# Patient Record
Sex: FEMALE | Race: WHITE | NOT HISPANIC OR LATINO | URBAN - METROPOLITAN AREA
[De-identification: names, ages, dates, MRNs, and addresses within clinical notes are randomized per-mention and may not be internally consistent; named-entity substitution may affect disease eponyms.]

---

## 2023-01-09 ENCOUNTER — OFFICE (OUTPATIENT)
Dept: URBAN - METROPOLITAN AREA CLINIC 64 | Facility: CLINIC | Age: 23
End: 2023-01-09

## 2023-01-09 VITALS
HEART RATE: 86 BPM | SYSTOLIC BLOOD PRESSURE: 128 MMHG | HEIGHT: 58 IN | DIASTOLIC BLOOD PRESSURE: 83 MMHG | WEIGHT: 150 LBS

## 2023-01-09 DIAGNOSIS — R10.13 EPIGASTRIC PAIN: ICD-10-CM

## 2023-01-09 DIAGNOSIS — R10.11 RIGHT UPPER QUADRANT PAIN: ICD-10-CM

## 2023-01-09 DIAGNOSIS — R11.0 NAUSEA: ICD-10-CM

## 2023-01-09 PROCEDURE — 99204 OFFICE O/P NEW MOD 45 MIN: CPT | Performed by: INTERNAL MEDICINE

## 2023-01-09 RX ORDER — PANTOPRAZOLE SODIUM 40 MG/1
TABLET, DELAYED RELEASE ORAL
Qty: 90 | Refills: 3 | Status: ACTIVE

## 2025-02-04 ENCOUNTER — APPOINTMENT (OUTPATIENT)
Dept: ULTRASOUND IMAGING | Facility: HOSPITAL | Age: 25
End: 2025-02-04
Payer: COMMERCIAL

## 2025-02-04 ENCOUNTER — HOSPITAL ENCOUNTER (OUTPATIENT)
Facility: HOSPITAL | Age: 25
Setting detail: OBSERVATION
Discharge: HOME | End: 2025-02-05
Attending: INTERNAL MEDICINE | Admitting: INTERNAL MEDICINE
Payer: COMMERCIAL

## 2025-02-04 DIAGNOSIS — R31.9 HEMATURIA, UNSPECIFIED TYPE: ICD-10-CM

## 2025-02-04 DIAGNOSIS — N13.30 HYDRONEPHROSIS, UNSPECIFIED HYDRONEPHROSIS TYPE: Primary | ICD-10-CM

## 2025-02-04 DIAGNOSIS — R10.9 FLANK PAIN: ICD-10-CM

## 2025-02-04 DIAGNOSIS — D64.9 ANEMIA, UNSPECIFIED TYPE: ICD-10-CM

## 2025-02-04 DIAGNOSIS — R01.1 HEART MURMUR: ICD-10-CM

## 2025-02-04 LAB
ANION GAP SERPL CALCULATED.3IONS-SCNC: 13.1 MMOL/L (ref 5–15)
BACTERIA UR QL AUTO: ABNORMAL /HPF
BASOPHILS # BLD AUTO: 0.03 10*3/MM3 (ref 0–0.2)
BASOPHILS NFR BLD AUTO: 0.3 % (ref 0–1.5)
BILIRUB UR QL STRIP: NEGATIVE
BUN SERPL-MCNC: 8 MG/DL (ref 6–20)
BUN/CREAT SERPL: 20 (ref 7–25)
CALCIUM SPEC-SCNC: 9.3 MG/DL (ref 8.6–10.5)
CHLORIDE SERPL-SCNC: 104 MMOL/L (ref 98–107)
CLARITY UR: CLEAR
CO2 SERPL-SCNC: 20.9 MMOL/L (ref 22–29)
COLOR UR: YELLOW
CREAT SERPL-MCNC: 0.4 MG/DL (ref 0.57–1)
DEPRECATED RDW RBC AUTO: 42.4 FL (ref 37–54)
EGFRCR SERPLBLD CKD-EPI 2021: 141.1 ML/MIN/1.73
EOSINOPHIL # BLD AUTO: 0.18 10*3/MM3 (ref 0–0.4)
EOSINOPHIL NFR BLD AUTO: 1.8 % (ref 0.3–6.2)
ERYTHROCYTE [DISTWIDTH] IN BLOOD BY AUTOMATED COUNT: 12.9 % (ref 12.3–15.4)
GLUCOSE SERPL-MCNC: 85 MG/DL (ref 65–99)
GLUCOSE UR STRIP-MCNC: NEGATIVE MG/DL
HCT VFR BLD AUTO: 33 % (ref 34–46.6)
HGB BLD-MCNC: 11.2 G/DL (ref 12–15.9)
HGB UR QL STRIP.AUTO: ABNORMAL
HYALINE CASTS UR QL AUTO: ABNORMAL /LPF
IMM GRANULOCYTES # BLD AUTO: 0.04 10*3/MM3 (ref 0–0.05)
IMM GRANULOCYTES NFR BLD AUTO: 0.4 % (ref 0–0.5)
KETONES UR QL STRIP: NEGATIVE
LEUKOCYTE ESTERASE UR QL STRIP.AUTO: NEGATIVE
LYMPHOCYTES # BLD AUTO: 1.26 10*3/MM3 (ref 0.7–3.1)
LYMPHOCYTES NFR BLD AUTO: 12.6 % (ref 19.6–45.3)
MCH RBC QN AUTO: 30.1 PG (ref 26.6–33)
MCHC RBC AUTO-ENTMCNC: 33.9 G/DL (ref 31.5–35.7)
MCV RBC AUTO: 88.7 FL (ref 79–97)
MONOCYTES # BLD AUTO: 0.72 10*3/MM3 (ref 0.1–0.9)
MONOCYTES NFR BLD AUTO: 7.2 % (ref 5–12)
NEUTROPHILS NFR BLD AUTO: 7.76 10*3/MM3 (ref 1.7–7)
NEUTROPHILS NFR BLD AUTO: 77.7 % (ref 42.7–76)
NITRITE UR QL STRIP: NEGATIVE
NRBC BLD AUTO-RTO: 0 /100 WBC (ref 0–0.2)
PH UR STRIP.AUTO: 6.5 [PH] (ref 5–8)
PLATELET # BLD AUTO: 244 10*3/MM3 (ref 140–450)
PMV BLD AUTO: 9 FL (ref 6–12)
POTASSIUM SERPL-SCNC: 4 MMOL/L (ref 3.5–5.2)
PROT UR QL STRIP: NEGATIVE
RBC # BLD AUTO: 3.72 10*6/MM3 (ref 3.77–5.28)
RBC # UR STRIP: ABNORMAL /HPF
REF LAB TEST METHOD: ABNORMAL
SODIUM SERPL-SCNC: 138 MMOL/L (ref 136–145)
SP GR UR STRIP: 1.01 (ref 1–1.03)
SQUAMOUS #/AREA URNS HPF: ABNORMAL /HPF
UROBILINOGEN UR QL STRIP: ABNORMAL
WBC # UR STRIP: ABNORMAL /HPF
WBC NRBC COR # BLD AUTO: 9.99 10*3/MM3 (ref 3.4–10.8)

## 2025-02-04 PROCEDURE — G0378 HOSPITAL OBSERVATION PER HR: HCPCS

## 2025-02-04 PROCEDURE — 76775 US EXAM ABDO BACK WALL LIM: CPT

## 2025-02-04 PROCEDURE — 81001 URINALYSIS AUTO W/SCOPE: CPT | Performed by: OCCUPATIONAL THERAPIST

## 2025-02-04 PROCEDURE — 80048 BASIC METABOLIC PNL TOTAL CA: CPT | Performed by: OCCUPATIONAL THERAPIST

## 2025-02-04 PROCEDURE — 99285 EMERGENCY DEPT VISIT HI MDM: CPT

## 2025-02-04 PROCEDURE — 85025 COMPLETE CBC W/AUTO DIFF WBC: CPT | Performed by: OCCUPATIONAL THERAPIST

## 2025-02-04 PROCEDURE — 36415 COLL VENOUS BLD VENIPUNCTURE: CPT

## 2025-02-04 PROCEDURE — 76805 OB US >/= 14 WKS SNGL FETUS: CPT

## 2025-02-04 RX ORDER — PRENATAL VIT/IRON FUM/FOLIC AC 27MG-0.8MG
1 TABLET ORAL DAILY
Status: DISCONTINUED | OUTPATIENT
Start: 2025-02-05 | End: 2025-02-05 | Stop reason: HOSPADM

## 2025-02-04 RX ORDER — SODIUM CHLORIDE 9 MG/ML
75 INJECTION, SOLUTION INTRAVENOUS CONTINUOUS
Status: DISCONTINUED | OUTPATIENT
Start: 2025-02-05 | End: 2025-02-05 | Stop reason: HOSPADM

## 2025-02-04 RX ORDER — SODIUM CHLORIDE 0.9 % (FLUSH) 0.9 %
10 SYRINGE (ML) INJECTION AS NEEDED
Status: DISCONTINUED | OUTPATIENT
Start: 2025-02-04 | End: 2025-02-05 | Stop reason: HOSPADM

## 2025-02-04 RX ORDER — ONDANSETRON 2 MG/ML
4 INJECTION INTRAMUSCULAR; INTRAVENOUS EVERY 6 HOURS PRN
Status: DISCONTINUED | OUTPATIENT
Start: 2025-02-04 | End: 2025-02-05 | Stop reason: HOSPADM

## 2025-02-04 RX ORDER — ACETAMINOPHEN 325 MG/1
650 TABLET ORAL EVERY 6 HOURS PRN
COMMUNITY

## 2025-02-04 RX ORDER — ACETAMINOPHEN 325 MG/1
650 TABLET ORAL EVERY 6 HOURS PRN
Status: DISCONTINUED | OUTPATIENT
Start: 2025-02-04 | End: 2025-02-05 | Stop reason: HOSPADM

## 2025-02-04 RX ORDER — ASPIRIN 81 MG/1
81 TABLET ORAL NIGHTLY
COMMUNITY

## 2025-02-04 RX ORDER — SODIUM CHLORIDE 0.9 % (FLUSH) 0.9 %
10 SYRINGE (ML) INJECTION EVERY 12 HOURS SCHEDULED
Status: DISCONTINUED | OUTPATIENT
Start: 2025-02-04 | End: 2025-02-05 | Stop reason: HOSPADM

## 2025-02-04 RX ORDER — AMOXICILLIN 250 MG
2 CAPSULE ORAL 2 TIMES DAILY PRN
Status: DISCONTINUED | OUTPATIENT
Start: 2025-02-04 | End: 2025-02-05 | Stop reason: HOSPADM

## 2025-02-04 RX ORDER — ONDANSETRON 4 MG/1
4 TABLET, ORALLY DISINTEGRATING ORAL EVERY 6 HOURS PRN
Status: DISCONTINUED | OUTPATIENT
Start: 2025-02-04 | End: 2025-02-05 | Stop reason: HOSPADM

## 2025-02-04 RX ORDER — SODIUM CHLORIDE 9 MG/ML
40 INJECTION, SOLUTION INTRAVENOUS AS NEEDED
Status: DISCONTINUED | OUTPATIENT
Start: 2025-02-04 | End: 2025-02-04

## 2025-02-04 RX ADMIN — Medication 10 ML: at 20:54

## 2025-02-04 RX ADMIN — Medication 10 ML: at 20:52

## 2025-02-04 NOTE — ED PROVIDER NOTES
Subjective   History of Present Illness  Patient is a 24-year-old G1, P0 female with ALEE of 7/29/2025 presenting to the ED for evaluation of flank pain.  She reports that 1 week ago, she was admitted overnight to [facility] for suspected nephrolithiasis.  Right flank pain was managed with morphine and Zofran, and eventually pain stopped and she was discharged home.  She reports that her urine was strained, and she did not see any large stones but it looked like sand.      She went back to [facility] 3 days ago for left flank pain, and they repeated labs but did not repeat imaging.  They gave her hydrocodone and told her to take that when she had pain.      Patient reports that pain came back today while she was at work and was a sharp stabbing pain in her left side.  She rates the pain 8 or 9 out of 10.  She took one of the hydrocodone's which decreased the pain.  She called her OB/GYN, Dr. Yu, and she recommended that she come here for further evaluation.      Patient reports that the pain is associated with vomiting.  Otherwise patient is not experiencing nausea, diarrhea, vomiting, fever, body aches, cough, nasal congestion, rhinorrhea.  She has not noticed any change in the color of her urine.  No vaginal bleeding or uterine cramping        Review of Systems   Constitutional:  Negative for fever.       No past medical history on file.    No Known Allergies    Past Surgical History:   Procedure Laterality Date    CHOLECYSTECTOMY         No family history on file.    Social History     Socioeconomic History    Marital status:    Tobacco Use    Smoking status: Never     Passive exposure: Never    Smokeless tobacco: Never   Vaping Use    Vaping status: Never Used   Substance and Sexual Activity    Alcohol use: Never    Drug use: Never    Sexual activity: Defer           Objective   Physical Exam  Vitals and nursing note reviewed.   Constitutional:       General: She is not in acute distress.      Appearance: She is well-developed and normal weight. She is not ill-appearing, toxic-appearing or diaphoretic.   HENT:      Head: Normocephalic and atraumatic.      Mouth/Throat:      Mouth: Mucous membranes are moist.   Eyes:      Extraocular Movements: Extraocular movements intact.      Pupils: Pupils are equal, round, and reactive to light.   Cardiovascular:      Rate and Rhythm: Normal rate and regular rhythm.      Heart sounds: Murmur heard.      No friction rub. No gallop.   Pulmonary:      Effort: Pulmonary effort is normal. No respiratory distress.      Breath sounds: Normal breath sounds. No stridor. No wheezing, rhonchi or rales.   Chest:      Chest wall: No tenderness.   Abdominal:      General: Bowel sounds are normal.      Palpations: Abdomen is soft. There is no hepatomegaly, splenomegaly or pulsatile mass.      Tenderness: There is no abdominal tenderness. There is no right CVA tenderness, left CVA tenderness or guarding. Negative signs include Gonzalez's sign, Rovsing's sign and McBurney's sign.   Skin:     General: Skin is warm and dry.      Capillary Refill: Capillary refill takes less than 2 seconds.      Coloration: Skin is not jaundiced or mottled.      Findings: No erythema or rash.   Neurological:      General: No focal deficit present.      Mental Status: She is alert.   Psychiatric:         Mood and Affect: Mood normal.         Behavior: Behavior normal.         Procedures           ED Course  ED Course as of 02/04/25 1749 Tue Feb 04, 2025   1457 Waiting on ultrasound [ME]   1654 Waiting on return call from Dr. Yu. [ME]   1712 Discussed patient with Dr. Yu.  She would like urology consulted for recommendations. [ME]      ED Course User Index  [ME] Andressa Jin PA-C      Labs Reviewed   BASIC METABOLIC PANEL - Abnormal; Notable for the following components:       Result Value    Creatinine 0.40 (*)     CO2 20.9 (*)     All other components within normal limits    Narrative:     GFR  Categories in Chronic Kidney Disease (CKD)      GFR Category          GFR (mL/min/1.73)    Interpretation  G1                     90 or greater         Normal or high (1)  G2                      60-89                Mild decrease (1)  G3a                   45-59                Mild to moderate decrease  G3b                   30-44                Moderate to severe decrease  G4                    15-29                Severe decrease  G5                    14 or less           Kidney failure          (1)In the absence of evidence of kidney disease, neither GFR category G1 or G2 fulfill the criteria for CKD.    eGFR calculation 2021 CKD-EPI creatinine equation, which does not include race as a factor   URINALYSIS W/ CULTURE IF INDICATED - Abnormal; Notable for the following components:    Blood, UA Moderate (2+) (*)     All other components within normal limits    Narrative:     In absence of clinical symptoms, the presence of pyuria, bacteria, and/or nitrites on the urinalysis result does not correlate with infection.   CBC WITH AUTO DIFFERENTIAL - Abnormal; Notable for the following components:    RBC 3.72 (*)     Hemoglobin 11.2 (*)     Hematocrit 33.0 (*)     Neutrophil % 77.7 (*)     Lymphocyte % 12.6 (*)     Neutrophils, Absolute 7.76 (*)     All other components within normal limits   URINALYSIS, MICROSCOPIC ONLY - Abnormal; Notable for the following components:    RBC, UA 6-10 (*)     Squamous Epithelial Cells, UA 3-6 (*)     All other components within normal limits   CBC AND DIFFERENTIAL    Narrative:     The following orders were created for panel order CBC & Differential.  Procedure                               Abnormality         Status                     ---------                               -----------         ------                     CBC Auto Differential[[id number]]        Abnormal            Final result                 Please view results for these tests on the individual orders.     US Ob 14 +  Weeks Single or First Gestation    Result Date: 2/4/2025  Impression: 1. Single viable intrauterine pregnancy with an estimated gestational age of 14 weeks and 6 days. There is normal interval growth when compared to the recent ultrasound. 2. The placenta is located posteriorly. The tip of the placenta may cover the internal os representing at least a marginal placenta previa. I would recommend a follow-up ultrasound during the late second trimester to better assess this. There is also a hypoechoic area in the placenta representing either an old placental infarct or venous lake. There is no placental abruption. Electronically Signed: Jaret Oneill MD  2/4/2025 4:17 PM EST  Workstation ID: [id number]    US Renal Bilateral    Result Date: 2/4/2025  Impression: 1. Mild left-sided hydronephrosis new from prior exam. 2. Normal appearance of the right kidney with no evidence of right-sided hydronephrosis. Electronically Signed: Cihn Sawant MD  2/4/2025 4:17 PM EST  Workstation ID: [id number]   Medications - No data to display                                                   Medical Decision Making  Patient is a 19-year-old female who presented with the above complaint.  She had the above evaluation and exam.    Labs grossly unremarkable.  Patient had mild anemia with 11.2 hemoglobin.  She did have some hematuria on urinalysis, so ultrasound was ordered.     Imaging independently interpreted by radiology and reviewed by myself.  OB ultrasound was normal for gestational age.  There was some posterior placement of the placenta, for which the recommendation was repeat ultrasound later in the pregnancy.  Ultrasound of bilateral kidneys showed mild left-sided hydronephrosis which is new from prior exam.  No abnormality of the right kidney.  There were no stones evident on exam.    Patient was discussed at length with Dr. Yu, who requested urology consultation.  Discussed patient with Dr. Sykes of urology, who suggested that  patient be admitted.  He will see patient in the morning to determine further course of action.  She will need to be n.p.o. after midnight.  Dr. Yu provided consent for patient to admit to the hospitalist versus the OB unit.    At reassessment, patient is resting comfortably no acute distress.  She is hemodynamically stable and afebrile.  No vaginal bleeding or uterine cramping.  No peritonitis or focal tenderness on abdominal exam.  Discussed patient with Dr. Miller, who accepts patient for admission        Amount and/or Complexity of Data Reviewed  Labs: ordered.  Radiology: ordered.        Final diagnoses:   Hematuria, unspecified type   Flank pain   Heart murmur   Anemia, unspecified type   Hydronephrosis, unspecified hydronephrosis type       ED Disposition  ED Disposition       ED Disposition   Intended Admit    Condition   --    Comment   --               No follow-up provider specified.       Medication List      No changes were made to your prescriptions during this visit.            Andressa Jin PA-C  02/04/25 5845

## 2025-02-04 NOTE — Clinical Note
Level of Care: Med/Surg [1]   Admitting Physician: DIETER PAYNE [[id number]]   Attending Physician: DIETER PAYNE [[id number]]

## 2025-02-05 VITALS
HEIGHT: 58 IN | BODY MASS INDEX: 30.13 KG/M2 | WEIGHT: 143.52 LBS | DIASTOLIC BLOOD PRESSURE: 78 MMHG | OXYGEN SATURATION: 99 % | RESPIRATION RATE: 16 BRPM | HEART RATE: 89 BPM | SYSTOLIC BLOOD PRESSURE: 115 MMHG | TEMPERATURE: 97.8 F

## 2025-02-05 LAB
ALBUMIN SERPL-MCNC: 3.9 G/DL (ref 3.5–5.2)
ALBUMIN/GLOB SERPL: 1.6 G/DL
ALP SERPL-CCNC: 49 U/L (ref 39–117)
ALT SERPL W P-5'-P-CCNC: 14 U/L (ref 1–33)
ANION GAP SERPL CALCULATED.3IONS-SCNC: 11 MMOL/L (ref 5–15)
AST SERPL-CCNC: 13 U/L (ref 1–32)
BASOPHILS # BLD AUTO: 0.03 10*3/MM3 (ref 0–0.2)
BASOPHILS NFR BLD AUTO: 0.4 % (ref 0–1.5)
BILIRUB SERPL-MCNC: <0.2 MG/DL (ref 0–1.2)
BUN SERPL-MCNC: 6 MG/DL (ref 6–20)
BUN/CREAT SERPL: 14.6 (ref 7–25)
CALCIUM SPEC-SCNC: 9.1 MG/DL (ref 8.6–10.5)
CHLORIDE SERPL-SCNC: 104 MMOL/L (ref 98–107)
CO2 SERPL-SCNC: 23 MMOL/L (ref 22–29)
CREAT SERPL-MCNC: 0.41 MG/DL (ref 0.57–1)
DEPRECATED RDW RBC AUTO: 43.5 FL (ref 37–54)
EGFRCR SERPLBLD CKD-EPI 2021: 140.2 ML/MIN/1.73
EOSINOPHIL # BLD AUTO: 0.22 10*3/MM3 (ref 0–0.4)
EOSINOPHIL NFR BLD AUTO: 3.1 % (ref 0.3–6.2)
ERYTHROCYTE [DISTWIDTH] IN BLOOD BY AUTOMATED COUNT: 13.2 % (ref 12.3–15.4)
GLOBULIN UR ELPH-MCNC: 2.5 GM/DL
GLUCOSE SERPL-MCNC: 88 MG/DL (ref 65–99)
HCT VFR BLD AUTO: 32.7 % (ref 34–46.6)
HGB BLD-MCNC: 10.8 G/DL (ref 12–15.9)
IMM GRANULOCYTES # BLD AUTO: 0.02 10*3/MM3 (ref 0–0.05)
IMM GRANULOCYTES NFR BLD AUTO: 0.3 % (ref 0–0.5)
LYMPHOCYTES # BLD AUTO: 1.59 10*3/MM3 (ref 0.7–3.1)
LYMPHOCYTES NFR BLD AUTO: 22.2 % (ref 19.6–45.3)
MCH RBC QN AUTO: 29.5 PG (ref 26.6–33)
MCHC RBC AUTO-ENTMCNC: 33 G/DL (ref 31.5–35.7)
MCV RBC AUTO: 89.3 FL (ref 79–97)
MONOCYTES # BLD AUTO: 0.65 10*3/MM3 (ref 0.1–0.9)
MONOCYTES NFR BLD AUTO: 9.1 % (ref 5–12)
NEUTROPHILS NFR BLD AUTO: 4.65 10*3/MM3 (ref 1.7–7)
NEUTROPHILS NFR BLD AUTO: 64.9 % (ref 42.7–76)
NRBC BLD AUTO-RTO: 0 /100 WBC (ref 0–0.2)
PLATELET # BLD AUTO: 241 10*3/MM3 (ref 140–450)
PMV BLD AUTO: 9.3 FL (ref 6–12)
POTASSIUM SERPL-SCNC: 3.6 MMOL/L (ref 3.5–5.2)
PROT SERPL-MCNC: 6.4 G/DL (ref 6–8.5)
RBC # BLD AUTO: 3.66 10*6/MM3 (ref 3.77–5.28)
SODIUM SERPL-SCNC: 138 MMOL/L (ref 136–145)
WBC NRBC COR # BLD AUTO: 7.16 10*3/MM3 (ref 3.4–10.8)

## 2025-02-05 PROCEDURE — 25810000003 SODIUM CHLORIDE 0.9 % SOLUTION: Performed by: NURSE PRACTITIONER

## 2025-02-05 PROCEDURE — G0378 HOSPITAL OBSERVATION PER HR: HCPCS

## 2025-02-05 PROCEDURE — 85025 COMPLETE CBC W/AUTO DIFF WBC: CPT | Performed by: NURSE PRACTITIONER

## 2025-02-05 PROCEDURE — 80053 COMPREHEN METABOLIC PANEL: CPT | Performed by: NURSE PRACTITIONER

## 2025-02-05 RX ADMIN — SODIUM CHLORIDE 75 ML/HR: 9 INJECTION, SOLUTION INTRAVENOUS at 00:42

## 2025-02-05 NOTE — DISCHARGE SUMMARY
[facility] Medicine Services  Discharge Summary    Date of Service: 2025  Patient Name: Nicki Salazar  : 2000  MRN: [id number]    Date of Admission: 2025  Discharge Diagnosis: Hydronephrosis  Date of Discharge: 2025  Primary Care Physician: Carmen Mcleod APRN      Presenting Problem:   Hydronephrosis [N13.30]  Heart murmur [R01.1]  Flank pain [R10.9]  Hydronephrosis, unspecified hydronephrosis type [N13.30]  Anemia, unspecified type [D64.9]  Hematuria, unspecified type [R31.9]    Active and Resolved Hospital Problems:  Active Hospital Problems    Diagnosis POA   • **Hydronephrosis [N13.30] Yes      Resolved Hospital Problems   No resolved problems to display.         Left sided hydronephrosis  Left flank pain, LLQ abdominal pain   Nausea and vomiting  - renal ultrasound: Mild left-sided hydronephrosis new from prior exam. Normal appearance of the right kidney with no evidence of right-sided hydronephrosis.   - WBC 9.99, hgb 11.2  - UA: moderate blood, RBC 6-10, no wbc, no bacteria  - IVFs, prn tylenol, prn antiemetics  - urology consulted     15 weeks gestation intrauterine pregnancy   - G1, P0  - OBGYN following      H/o cholecystectomy     Hospital Course       Hospital Course:  Nicki Salazar is a 24 y.o. healthy female G1, P0 15 weeks gestation presented to [facility] ED 2025 with complaints of left flank pain and left sided abdominal pain, nausea, and vomiting. She stated she had right flank pain a week ago and was treated at [facility] ER for suspected nephrolithiasis and UTI. She was treated with morphine and her pain improved and she was discharged home. She stated her urine looked like sand and then it improved so she was thought to have passed a kidney stone. She returned to [facility] ER 4 days ago for left sided flank pain, nausea, and vomiting. She did not have any workup but was given a Rx for hydrocodone and advised not to take more than 2 doses and  to see her OB. She stated she did ok over the weekend and then today while at work she started having left sided flank pain and left sided abdominal pain that is a sharp, stabbing pain. She denied any hematuria. She took a hydrocodone and called her OB Dr. Yu. She was advised to come to [place] [facility] for urology evaluation.      In the ED the patient had renal ultrasound showed mild left-sided hydronephrosis new from prior exam. Normal appearance of the right kidney with no evidence of right-sided hydronephrosis.   WBC normal 9.99, hgb 11.2. Urinalysis showed moderate blood, RBC 6-10, no bacteria. ER spoke with urology Dr. Sykes who recommended admission and NPO after midnight. Dr. Yu OBGYN consulted.      No current pain on exam after taking one time dose of her home hydrocodone. She would prefer to not take pain medications if not necessary.       2/5 seen in bed NAD, Doing better today, will dc home.        DISCHARGE Follow Up Recommendations for labs and diagnostics: follow with pcp in one week  Follow with urology 1 week  Day of Discharge     Vital Signs:  Temp:  [97.8 °F (36.6 °C)-98.9 °F (37.2 °C)] 97.8 °F (36.6 °C)  Heart Rate:  [] 89  Resp:  [14-20] 16  BP: (101-119)/(62-79) 115/78      Physical Exam   Vitals and nursing note reviewed.   HENT:      Head: Normocephalic and atraumatic.   Eyes:      Extraocular Movements: Extraocular movements intact.      Pupils: Pupils are equal, round, and reactive to light.   Cardiovascular:      Rate and Rhythm: Normal rate and regular rhythm.      Pulses: Normal pulses.      Heart sounds: Normal heart sounds.   Pulmonary:      Effort: Pulmonary effort is normal.      Breath sounds: Normal breath sounds.   Abdominal:      General: Bowel sounds are normal.      Palpations: Abdomen is soft.      Tenderness: There is no abdominal tenderness.   Musculoskeletal:         General: Normal range of motion.   Skin:     General: Skin is warm and dry.   Neurological:      Mental  Status: She is alert and oriented to person, place, and time.   Psychiatric:         Mood and Affect: Mood normal.         Behavior: Behavior normal.       Pertinent  and/or Most Recent Results     LAB RESULTS:      Lab 02/05/25  0434 02/04/25  1433   WBC 7.16 9.99   HEMOGLOBIN 10.8* 11.2*   HEMATOCRIT 32.7* 33.0*   PLATELETS 241 244   NEUTROS ABS 4.65 7.76*   IMMATURE GRANS (ABS) 0.02 0.04   LYMPHS ABS 1.59 1.26   MONOS ABS 0.65 0.72   EOS ABS 0.22 0.18   MCV 89.3 88.7         Lab 02/05/25  0434 02/04/25  1433   SODIUM 138 138   POTASSIUM 3.6 4.0   CHLORIDE 104 104   CO2 23.0 20.9*   ANION GAP 11.0 13.1   BUN 6 8   CREATININE 0.41* 0.40*   EGFR 140.2 141.1   GLUCOSE 88 85   CALCIUM 9.1 9.3         Lab 02/05/25  0434   TOTAL PROTEIN 6.4   ALBUMIN 3.9   GLOBULIN 2.5   ALT (SGPT) 14   AST (SGOT) 13   BILIRUBIN <0.2   ALK PHOS 49                     Brief Urine Lab Results  (Last result in the past 365 days)        Color   Clarity   Blood   Leuk Est   Nitrite   Protein   CREAT   Urine HCG        02/04/25 1424 Yellow   Clear   Moderate (2+)   Negative   Negative   Negative                 Microbiology Results (last 10 days)       ** No results found for the last 240 hours. **            US Ob 14 + Weeks Single or First Gestation    Result Date: 2/4/2025  Impression: Impression: 1. Single viable intrauterine pregnancy with an estimated gestational age of 14 weeks and 6 days. There is normal interval growth when compared to the recent ultrasound. 2. The placenta is located posteriorly. The tip of the placenta may cover the internal os representing at least a marginal placenta previa. I would recommend a follow-up ultrasound during the late second trimester to better assess this. There is also a hypoechoic area in the placenta representing either an old placental infarct or venous lake. There is no placental abruption. Electronically Signed: Jaret Oneill MD  2/4/2025 4:17 PM EST  Workstation ID: [id number]     Renal  Bilateral    Result Date: 2/4/2025  Impression: Impression: 1. Mild left-sided hydronephrosis new from prior exam. 2. Normal appearance of the right kidney with no evidence of right-sided hydronephrosis. Electronically Signed: Chin Sawant MD  2/4/2025 4:17 PM EST  Workstation ID: [id number]                 Labs Pending at Discharge:  Pending Results       None            Procedures Performed           Consults:   Consults       Date and Time Order Name Status Description    2/4/2025  5:38 PM Hospitalist (on-call MD unless specified)      2/4/2025  5:11 PM Urology (on-call MD unless specified) Completed     2/4/2025  4:22 PM OB/GYN (on-call MD unless specified)                Discharge Details        Discharge Medications        Continue These Medications        Instructions Start Date   acetaminophen 325 MG tablet  Commonly known as: TYLENOL   650 mg, Every 6 Hours PRN      aspirin 81 MG EC tablet   81 mg, Nightly      ondansetron ODT 4 MG disintegrating tablet  Commonly known as: ZOFRAN-ODT   DISSOLVE 1 TABLET ON THE TONGUE EVERY 8 HOURS FOR 10 DAYS AS NEEDED FOR NAUSEA      prenatal vitamin 28-0.8 28-0.8 MG tablet tablet   1 tablet, Daily      [employer] 25 MG tablet  Generic drug: doxylamine   25 mg, Every Night at Bedtime               No Known Allergies      Discharge Disposition:   Home or Self Care    Diet:  Hospital:  Diet Order   Procedures   • Diet: Regular/House; Fluid Consistency: Thin (IDDSI 0)         Discharge Activity:   Activity Instructions       Gradually Increase Activity Until at Pre-Hospitalization Level                CODE STATUS:  Code Status and Medical Interventions: CPR (Attempt to Resuscitate); Full Support   Ordered at: 02/04/25 1940     Level Of Support Discussed With:    Patient     Code Status (Patient has no pulse and is not breathing):    CPR (Attempt to Resuscitate)     Medical Interventions (Patient has pulse or is breathing):    Full Support         No future  appointments.    Additional Instructions for the Follow-ups that You Need to Schedule       Discharge Follow-up with PCP   As directed       Currently Documented PCP:    Carmen Mcleod APRN    PCP Phone Number:    [phone]     Follow Up Details: 1 week        Discharge Follow-up with Specified Provider: 1 week; 1 Week   As directed      To: 1 week   Follow Up: 1 Week                Time spent on Discharge including face to face service:  35 minutes    Signature: Electronically signed by Migue Choudhury MD, 02/05/25, 13:20 EST.  [facility], [place], operated by [facility] Hospitalist Team

## 2025-02-05 NOTE — CONSULTS
[facility] CONSULT      Patient Identification:  NAME:  Nicki Salazar  Age:  24 y.o.   Sex:  female   :  2000   MRN:  [id number]     Chief complaint: Left flank pain    History of present illness:      This patient is not known to [facility].  15 Weeks pregnant  Left flank pain is now resolved and doing well in the room.    2 prior hospitalizations for both right then left flank pain in the past several days at [facility].  Records are not available.  The patient was sent home both times presuming she had passed a kidney stone?  The patient did note passing some sand like material?  She has no other prior history of kidney stones.    She came to [facility] yesterday February 3, 2025 renal ultrasound showed  mild left-sided hydronephrosis new from prior exam. Normal appearance of the right kidney with no evidence of right-sided hydronephrosis.   WBC normal 9.99, hgb 11.2. Urinalysis showed moderate blood, RBC 6-10, no bacteria.    Kidney normal    In hospital:    Asked to see    Past medical history:  History reviewed. No pertinent past medical history.    Past surgical history:  Past Surgical History:   Procedure Laterality Date    CHOLECYSTECTOMY         Allergies:  Patient has no known allergies.    Home medications:  Medications Prior to Admission   Medication Sig Dispense Refill Last Dose/Taking    acetaminophen (TYLENOL) 325 MG tablet Take 2 tablets by mouth Every 6 (Six) Hours As Needed for Mild Pain.   2025    aspirin 81 MG EC tablet Take 1 tablet by mouth Every Night.   2025    ondansetron ODT (ZOFRAN-ODT) 4 MG disintegrating tablet DISSOLVE 1 TABLET ON THE TONGUE EVERY 8 HOURS FOR 10 DAYS AS NEEDED FOR NAUSEA   2/3/2025    Prenatal Vit-Fe Fumarate-FA (prenatal vitamin 28-0.8) 28-0.8 MG tablet tablet Take 1 tablet by mouth Daily.   2025    [employer] 25 MG tablet Take 1 tablet by mouth every night at bedtime.   2/3/2025        Hospital medications:  prenatal  vitamin, 1 tablet, Oral, Daily  sodium chloride, 10 mL, Intravenous, Q12H      sodium chloride, 75 mL/hr, Last Rate: 75 mL/hr (25 0042)        acetaminophen    ondansetron ODT **OR** ondansetron    senna-docusate sodium    sodium chloride    Family history:  History reviewed. No pertinent family history.    Social history:  Social History     Tobacco Use    Smoking status: Never     Passive exposure: Never    Smokeless tobacco: Never   Vaping Use    Vaping status: Never Used   Substance Use Topics    Alcohol use: Never    Drug use: Never       Review of systems:      Positive for:  nothing  Negative for:  chest pain, cough, sob, o/w neg    Objective:  TMax 24 hours:   Temp (24hrs), Av.5 °F (36.9 °C), Min:98 °F (36.7 °C), Max:98.9 °F (37.2 °C)      Vitals Ranges:   Temp:  [98 °F (36.7 °C)-98.9 °F (37.2 °C)] 98.8 °F (37.1 °C)  Heart Rate:  [] 83  Resp:  [14-20] 16  BP: (101-119)/(65-79) 109/67    Intake/Output Last 3 shifts:  No intake/output data recorded.     Physical Exam:    General Appearance:    Alert, cooperative, NAD   Back:     No CVA tenderness   Lungs:     Respirations unlabored, no wheezing    Heart:    RRR, intact peripheral pulses   Abdomen:     Soft, NDNT, no masses, no guarding   :    Pelvic not performed, bladder nondistended and nontender   Neuro/Psych:   Orientation intact, mood/affect pleasant       Results review:   I reviewed the patient's new clinical results.    Data review:  Lab Results (last 24 hours)       Procedure Component Value Units Date/Time    Comprehensive Metabolic Panel [[id number]]  (Abnormal) Collected: 25 0434    Specimen: Blood Updated: 25     Glucose 88 mg/dL      BUN 6 mg/dL      Creatinine 0.41 mg/dL      Sodium 138 mmol/L      Potassium 3.6 mmol/L      Chloride 104 mmol/L      CO2 23.0 mmol/L      Calcium 9.1 mg/dL      Total Protein 6.4 g/dL      Albumin 3.9 g/dL      ALT (SGPT) 14 U/L      AST (SGOT) 13 U/L      Alkaline Phosphatase 49  U/L      Total Bilirubin <0.2 mg/dL      Globulin 2.5 gm/dL      A/G Ratio 1.6 g/dL      BUN/Creatinine Ratio 14.6     Anion Gap 11.0 mmol/L      eGFR 140.2 mL/min/1.73     Narrative:      GFR Categories in Chronic Kidney Disease (CKD)      GFR Category          GFR (mL/min/1.73)    Interpretation  G1                     90 or greater         Normal or high (1)  G2                      60-89                Mild decrease (1)  G3a                   45-59                Mild to moderate decrease  G3b                   30-44                Moderate to severe decrease  G4                    15-29                Severe decrease  G5                    14 or less           Kidney failure          (1)In the absence of evidence of kidney disease, neither GFR category G1 or G2 fulfill the criteria for CKD.    eGFR calculation 2021 CKD-EPI creatinine equation, which does not include race as a factor    CBC Auto Differential [[id number]]  (Abnormal) Collected: 02/05/25 0434    Specimen: Blood Updated: 02/05/25 0519     WBC 7.16 10*3/mm3      RBC 3.66 10*6/mm3      Hemoglobin 10.8 g/dL      Hematocrit 32.7 %      MCV 89.3 fL      MCH 29.5 pg      MCHC 33.0 g/dL      RDW 13.2 %      RDW-SD 43.5 fl      MPV 9.3 fL      Platelets 241 10*3/mm3      Neutrophil % 64.9 %      Lymphocyte % 22.2 %      Monocyte % 9.1 %      Eosinophil % 3.1 %      Basophil % 0.4 %      Immature Grans % 0.3 %      Neutrophils, Absolute 4.65 10*3/mm3      Lymphocytes, Absolute 1.59 10*3/mm3      Monocytes, Absolute 0.65 10*3/mm3      Eosinophils, Absolute 0.22 10*3/mm3      Basophils, Absolute 0.03 10*3/mm3      Immature Grans, Absolute 0.02 10*3/mm3      nRBC 0.0 /100 WBC     Basic Metabolic Panel [[id number]]  (Abnormal) Collected: 02/04/25 1433    Specimen: Blood Updated: 02/04/25 1458     Glucose 85 mg/dL      BUN 8 mg/dL      Creatinine 0.40 mg/dL      Sodium 138 mmol/L      Potassium 4.0 mmol/L      Comment: Specimen hemolyzed.  Result may be falsely  elevated.        Chloride 104 mmol/L      CO2 20.9 mmol/L      Calcium 9.3 mg/dL      BUN/Creatinine Ratio 20.0     Anion Gap 13.1 mmol/L      eGFR 141.1 mL/min/1.73     Narrative:      GFR Categories in Chronic Kidney Disease (CKD)      GFR Category          GFR (mL/min/1.73)    Interpretation  G1                     90 or greater         Normal or high (1)  G2                      60-89                Mild decrease (1)  G3a                   45-59                Mild to moderate decrease  G3b                   30-44                Moderate to severe decrease  G4                    15-29                Severe decrease  G5                    14 or less           Kidney failure          (1)In the absence of evidence of kidney disease, neither GFR category G1 or G2 fulfill the criteria for CKD.    eGFR calculation 2021 CKD-EPI creatinine equation, which does not include race as a factor    Urinalysis With Culture If Indicated - Urine, Clean Catch [[id number]]  (Abnormal) Collected: 02/04/25 1424    Specimen: Urine, Clean Catch Updated: 02/04/25 1446     Color, UA Yellow     Appearance, UA Clear     pH, UA 6.5     Specific Gravity, UA 1.012     Glucose, UA Negative     Ketones, UA Negative     Bilirubin, UA Negative     Blood, UA Moderate (2+)     Protein, UA Negative     Leuk Esterase, UA Negative     Nitrite, UA Negative     Urobilinogen, UA 0.2 E.U./dL    Narrative:      In absence of clinical symptoms, the presence of pyuria, bacteria, and/or nitrites on the urinalysis result does not correlate with infection.    Urinalysis, Microscopic Only - Urine, Clean Catch [[id number]]  (Abnormal) Collected: 02/04/25 1424    Specimen: Urine, Clean Catch Updated: 02/04/25 1446     RBC, UA 6-10 /HPF      WBC, UA 0-2 /HPF      Comment: Urine culture not indicated.        Bacteria, UA None Seen /HPF      Squamous Epithelial Cells, UA 3-6 /HPF      Hyaline Casts, UA None Seen /LPF      Methodology Automated Microscopy    CBC &  Differential [[id number]]  (Abnormal) Collected: 02/04/25 1433    Specimen: Blood Updated: 02/04/25 1439    Narrative:      The following orders were created for panel order CBC & Differential.  Procedure                               Abnormality         Status                     ---------                               -----------         ------                     CBC Auto Differential[[id number]]        Abnormal            Final result                 Please view results for these tests on the individual orders.    CBC Auto Differential [[id number]]  (Abnormal) Collected: 02/04/25 1433    Specimen: Blood Updated: 02/04/25 1439     WBC 9.99 10*3/mm3      RBC 3.72 10*6/mm3      Hemoglobin 11.2 g/dL      Hematocrit 33.0 %      MCV 88.7 fL      MCH 30.1 pg      MCHC 33.9 g/dL      RDW 12.9 %      RDW-SD 42.4 fl      MPV 9.0 fL      Platelets 244 10*3/mm3      Neutrophil % 77.7 %      Lymphocyte % 12.6 %      Monocyte % 7.2 %      Eosinophil % 1.8 %      Basophil % 0.3 %      Immature Grans % 0.4 %      Neutrophils, Absolute 7.76 10*3/mm3      Lymphocytes, Absolute 1.26 10*3/mm3      Monocytes, Absolute 0.72 10*3/mm3      Eosinophils, Absolute 0.18 10*3/mm3      Basophils, Absolute 0.03 10*3/mm3      Immature Grans, Absolute 0.04 10*3/mm3      nRBC 0.0 /100 WBC              Imaging:  Imaging Results (Last 24 Hours)       Procedure Component Value Units Date/Time    US Ob 14 + Weeks Single or First Gestation [[id number]] Collected: 02/04/25 1609     Updated: 02/04/25 1619    Narrative:      US OB 14 + WEEKS SINGLE OR FIRST GESTATION    Date of Exam: 2/4/2025 3:32 PM EST    Indication: Pelvic pain. The patient is approximately 15 weeks pregnant.    Comparison: Pregnancy ultrasound performed at [facility] on 1/28/2025.    Technique: Grayscale and color Doppler transabdominal ultrasound was performed for maternal and fetal evaluation after the first trimester single gestation. Images were obtained per  protocol.      Findings:  The fetal measurements indicate an estimated gestational age of 14 weeks and 6 days. There is normal interval growth. The estimated fetal weight percentage by Hadlock is 18.7%. The fetal measurements correlate well with one another with no significant   discrepancies. There is cardiac motion with a normal rate of 145 bpm. The placenta is located posteriorly. The tip of the placenta may cover the internal os representing a marginal placenta previa. The cervical length measures 4.6 cm. I would recommend a   follow-up ultrasound during the late second trimester to better assess this. There is a hypoechoic area within the placenta measuring 2.5 x 0.8 x 2.4 cm not seen well on the outside exam. This may represent an old placental infarct or venous lake. There   is no placental abruption. The amniotic fluid volume is normal. The largest pocket of fluid measures 2.5 cm in depth.      Impression:      Impression:    1. Single viable intrauterine pregnancy with an estimated gestational age of 14 weeks and 6 days. There is normal interval growth when compared to the recent ultrasound.  2. The placenta is located posteriorly. The tip of the placenta may cover the internal os representing at least a marginal placenta previa. I would recommend a follow-up ultrasound during the late second trimester to better assess this. There is also a   hypoechoic area in the placenta representing either an old placental infarct or venous lake. There is no placental abruption.        Electronically Signed: Jaret Oneill MD    2/4/2025 4:17 PM EST    Workstation ID: [id number]    US Renal Bilateral [[id number]] Collected: 02/04/25 1614     Updated: 02/04/25 1619    Narrative:      US RENAL BILATERAL    Date of Exam: 2/4/2025 3:32 PM EST    Indication: flank pain.    Comparison: 1/28/2025    Technique: Grayscale and color Doppler ultrasound evaluation of the kidneys and urinary bladder was performed.      Findings:  Right  kidney measure 11.3 cm and left kidney measures 12.1 cm in lets-lg-pint length. There is normal thickness and normal echogenicity of the renal parenchyma bilaterally. There is some minimal left-sided hydronephrosis. No right hydronephrosis. There   is color Doppler flow to both kidneys.    Limited images of the urinary bladder are unremarkable.      Impression:      Impression:    1. Mild left-sided hydronephrosis new from prior exam.  2. Normal appearance of the right kidney with no evidence of right-sided hydronephrosis.        Electronically Signed: Chin Sawant MD    2/4/2025 4:17 PM EST    Workstation ID: [id number]             Assessment:     15 weeks pregnant      mild left-sided hydronephrosis new from prior exam.     Anemia mild, hemoglobin 10.8 down from    13.9 from labs completed May 2023    Microscopic hematuria ,    plan:     Urology will sign off, follow-up in 1 month with renal ultrasound at  [facility].    VALERIE Rojas  02/05/25  06:58 EST

## 2025-02-05 NOTE — ED NOTES
"Nursing report ED to floor  Nicki Salazar  24 y.o.  female    HPI:   Chief Complaint   Patient presents with    Abdominal Pain     Pt reports she is 15 weeks pregnant c/o LLQ abdominal pain-has been last week at [facility] ED for this with no new dx, pt denies any vaginal bleeding       Admitting doctor:   Darci Miller MD    Admitting diagnosis:   The primary encounter diagnosis was Hydronephrosis, unspecified hydronephrosis type. Diagnoses of Hematuria, unspecified type, Flank pain, Heart murmur, and Anemia, unspecified type were also pertinent to this visit.    Code status:   Current Code Status       Date Active Code Status Order ID Comments User Context       Not on file            Allergies:   Patient has no known allergies.    Isolation:  No active isolations     Fall Risk:  Fall Risk Assessment was completed, and patient is at low risk for falls.   Predictive Model Details         0 (Low) Factor Value    Calculated 2/4/2025 19:18 Imaging order in this encounter Present    Risk of Fall Model Magnesium not on file     Age 24     Creatinine 0.4 mg/dL     Preston Scale not on file     Diastolic BP 77     Albumin not on file     Respiratory Rate 16     Calcium 9.3 mg/dL     Days after Admission 0.227     Total Bilirubin not on file     Potassium 4 mmol/L     Chloride 104 mmol/L     ALT not on file         Weight:       02/04/25  1347   Weight: 65.1 kg (143 lb 8.3 oz)       Intake and Output  No intake or output data in the 24 hours ending 02/04/25 1922    Diet:        Most recent vitals:   Vitals:    02/04/25 1347 02/04/25 1548 02/04/25 1655 02/04/25 1758   BP: 119/72 108/67 101/65 107/77   BP Location: Left arm Left arm Left arm Right arm   Patient Position: Sitting Lying Lying Lying   Pulse: 104 80 106 96   Resp: 16 14 14 16   Temp: 98.1 °F (36.7 °C)      TempSrc: Oral      SpO2: 100% 97% 99% 99%   Weight: 65.1 kg (143 lb 8.3 oz)      Height: 147.3 cm (58\")          Active LDAs/IV Access:   Lines, Drains & " Airways       Active LDAs       None                    Skin Condition:   Skin Assessments (last day)       None             Labs (abnormal labs have a star):   Labs Reviewed   BASIC METABOLIC PANEL - Abnormal; Notable for the following components:       Result Value    Creatinine 0.40 (*)     CO2 20.9 (*)     All other components within normal limits    Narrative:     GFR Categories in Chronic Kidney Disease (CKD)      GFR Category          GFR (mL/min/1.73)    Interpretation  G1                     90 or greater         Normal or high (1)  G2                      60-89                Mild decrease (1)  G3a                   45-59                Mild to moderate decrease  G3b                   30-44                Moderate to severe decrease  G4                    15-29                Severe decrease  G5                    14 or less           Kidney failure          (1)In the absence of evidence of kidney disease, neither GFR category G1 or G2 fulfill the criteria for CKD.    eGFR calculation 2021 CKD-EPI creatinine equation, which does not include race as a factor   URINALYSIS W/ CULTURE IF INDICATED - Abnormal; Notable for the following components:    Blood, UA Moderate (2+) (*)     All other components within normal limits    Narrative:     In absence of clinical symptoms, the presence of pyuria, bacteria, and/or nitrites on the urinalysis result does not correlate with infection.   CBC WITH AUTO DIFFERENTIAL - Abnormal; Notable for the following components:    RBC 3.72 (*)     Hemoglobin 11.2 (*)     Hematocrit 33.0 (*)     Neutrophil % 77.7 (*)     Lymphocyte % 12.6 (*)     Neutrophils, Absolute 7.76 (*)     All other components within normal limits   URINALYSIS, MICROSCOPIC ONLY - Abnormal; Notable for the following components:    RBC, UA 6-10 (*)     Squamous Epithelial Cells, UA 3-6 (*)     All other components within normal limits   CBC AND DIFFERENTIAL    Narrative:     The following orders were created  for panel order CBC & Differential.  Procedure                               Abnormality         Status                     ---------                               -----------         ------                     CBC Auto Differential[[id number]]        Abnormal            Final result                 Please view results for these tests on the individual orders.       LOC: Person, Place, Time, and Situation    Telemetry:  Med/Surg    Cardiac Monitoring Ordered: no    EKG:   No orders to display       Medications Given in the ED:   Medications - No data to display    Imaging results:  US Ob 14 + Weeks Single or First Gestation    Result Date: 2/4/2025  Impression: 1. Single viable intrauterine pregnancy with an estimated gestational age of 14 weeks and 6 days. There is normal interval growth when compared to the recent ultrasound. 2. The placenta is located posteriorly. The tip of the placenta may cover the internal os representing at least a marginal placenta previa. I would recommend a follow-up ultrasound during the late second trimester to better assess this. There is also a hypoechoic area in the placenta representing either an old placental infarct or venous lake. There is no placental abruption. Electronically Signed: Jaert Oneill MD  2/4/2025 4:17 PM EST  Workstation ID: [id number]    US Renal Bilateral    Result Date: 2/4/2025  Impression: 1. Mild left-sided hydronephrosis new from prior exam. 2. Normal appearance of the right kidney with no evidence of right-sided hydronephrosis. Electronically Signed: Chin Sawant MD  2/4/2025 4:17 PM EST  Workstation ID: [id number]     Social issues:   Social History     Socioeconomic History    Marital status:    Tobacco Use    Smoking status: Never     Passive exposure: Never    Smokeless tobacco: Never   Vaping Use    Vaping status: Never Used   Substance and Sexual Activity    Alcohol use: Never    Drug use: Never    Sexual activity: Defer       NIH Stroke  Scale:  Interval: (not recorded)  1a. Level of Consciousness: (not recorded)  1b. LOC Questions: (not recorded)  1c. LOC Commands: (not recorded)  2. Best Gaze: (not recorded)  3. Visual: (not recorded)  4. Facial Palsy: (not recorded)  5a. Motor Arm, Left: (not recorded)  5b. Motor Arm, Right: (not recorded)  6a. Motor Leg, Left: (not recorded)  6b. Motor Leg, Right: (not recorded)  7. Limb Ataxia: (not recorded)  8. Sensory: (not recorded)  9. Best Language: (not recorded)  10. Dysarthria: (not recorded)  11. Extinction and Inattention (formerly Neglect): (not recorded)    Total (NIH Stroke Scale): (not recorded)     Additional notable assessment information:Ambulatory at will without need for assistance, no skin issues reported or noted     Nursing report ED to floor:  Report at bedside in room 2016 with Delmy FRIEDMAN for Lizeth on 4A.    Pallavi Valerio RN   02/04/25 19:22 EST

## 2025-02-05 NOTE — CASE MANAGEMENT/SOCIAL WORK
Discharge Planning Assessment   [facility]     Patient Name: Nicki Salazar  MRN: [id number]  Today's Date: 2/5/2025    Admit Date: 2/4/2025    Plan: Routine home with spouse   Discharge Needs Assessment       Row Name 02/05/25 0909       Living Environment    People in Home spouse    Name(s) of People in Home Cayden    Current Living Arrangements home    Potentially Unsafe Housing Conditions none    In the past 12 months has the electric, gas, oil, or water company threatened to shut off services in your home? No    Primary Care Provided by self    Provides Primary Care For no one    Family Caregiver if Needed spouse    Family Caregiver Names Cayden    Quality of Family Relationships helpful;involved;supportive    Able to Return to Prior Arrangements yes       Resource/Environmental Concerns    Resource/Environmental Concerns none    Transportation Concerns none       Transportation Needs    In the past 12 months, has lack of transportation kept you from medical appointments or from getting medications? no    In the past 12 months, has lack of transportation kept you from meetings, work, or from getting things needed for daily living? No       Food Insecurity    Within the past 12 months, you worried that your food would run out before you got the money to buy more. Never true    Within the past 12 months, the food you bought just didn't last and you didn't have money to get more. Never true       Transition Planning    Patient/Family Anticipates Transition to home with family    Patient/Family Anticipated Services at Transition none    Transportation Anticipated car, drives self       Discharge Needs Assessment    Readmission Within the Last 30 Days no previous admission in last 30 days    Equipment Currently Used at Home glucometer    Concerns to be Addressed denies needs/concerns at this time    Do you want help finding or keeping work or a job? I do not need or want help    Do you want help with school or training?  For example, starting or completing job training or getting a high school diploma, GED or equivalent No    Anticipated Changes Related to Illness none    Equipment Needed After Discharge none                   Discharge Plan       Row Name 02/05/25 0909       Plan    Plan Routine home with spouse    Patient/Family in Agreement with Plan yes    Plan Comments CM met with patient at bedside. Confirmed PCP, insurance, and pharmacy. Patient says she has an appoitment at the end of the month with Carmen Mcleod her PCP.  Meds to beds denied. Patient denies any difficulty affording medications. Patient not current with any therapies. Confirmed transportation at discharge will be herself. DC Barriers: IVF, npo, urology and OB following.                  Continued Care and Services - Admitted Since 2/4/2025    No active coordination exists for this encounter.       Expected Discharge Date and Time       Expected Discharge Date Expected Discharge Time    Feb 5, 2025            Demographic Summary       Row Name 02/05/25 0908       General Information    Admission Type observation    Arrived From emergency department    Referral Source admission list    Reason for Consult discharge planning    Preferred Language English       Contact Information    Permission Granted to Share Info With                    Functional Status       Row Name 02/05/25 0908       Functional Status    Usual Activity Tolerance good    Current Activity Tolerance good       Physical Activity    On average, how many days per week do you engage in moderate to strenuous exercise (like a brisk walk)? 3 days    On average, how many minutes do you engage in exercise at this level? 30 min    Number of minutes of exercise per week 90       Functional Status, IADL    Medications independent    Meal Preparation independent    Housekeeping independent    Laundry independent    Shopping independent    If for any reason you need help with day-to-day  activities such as bathing, preparing meals, shopping, managing finances, etc., do you get the help you need? I don't need any help               Kelsie Hand RN      Office: [phone]

## 2025-02-05 NOTE — PROGRESS NOTES
[facility] MEDICINE SERVICE  DAILY PROGRESS NOTE    NAME: Nicki Salazar  : 2000  MRN: [id number]      LOS: 0 days     PROVIDER OF SERVICE: Migue Choudhury MD    Chief Complaint: Hydronephrosis    Subjective:     Interval History:  History taken from: patient      Nicki Salazar is a 24 y.o. healthy female G1, P0 15 weeks gestation presented to [facility] ED 2025 with complaints of left flank pain and left sided abdominal pain, nausea, and vomiting. She stated she had right flank pain a week ago and was treated at [facility] ER for suspected nephrolithiasis and UTI. She was treated with morphine and her pain improved and she was discharged home. She stated her urine looked like sand and then it improved so she was thought to have passed a kidney stone. She returned to [facility] ER 4 days ago for left sided flank pain, nausea, and vomiting. She did not have any workup but was given a Rx for hydrocodone and advised not to take more than 2 doses and to see her OB. She stated she did ok over the weekend and then today while at work she started having left sided flank pain and left sided abdominal pain that is a sharp, stabbing pain. She denied any hematuria. She took a hydrocodone and called her OB Dr. Yu. She was advised to come to [place] ER for urology evaluation.      In the ED the patient had renal ultrasound showed mild left-sided hydronephrosis new from prior exam. Normal appearance of the right kidney with no evidence of right-sided hydronephrosis.   WBC normal 9.99, hgb 11.2. Urinalysis showed moderate blood, RBC 6-10, no bacteria. ER spoke with urology Dr. Sykes who recommended admission and NPO after midnight. Dr. Yu OBGYN consulted.      No current pain on exam after taking one time dose of her home hydrocodone. She would prefer to not take pain medications if not necessary.      2/5 seen in bed NAD, Doing better today, will dc home.      Review of Systems    Objective:     Vital  Signs  Temp:  [97.8 °F (36.6 °C)-98.9 °F (37.2 °C)] 97.8 °F (36.6 °C)  Heart Rate:  [] 89  Resp:  [14-20] 16  BP: (101-119)/(62-79) 115/78   Body mass index is 30 kg/m².      Physical Exam  Vitals and nursing note reviewed.   HENT:      Head: Normocephalic and atraumatic.   Eyes:      Extraocular Movements: Extraocular movements intact.      Pupils: Pupils are equal, round, and reactive to light.   Cardiovascular:      Rate and Rhythm: Normal rate and regular rhythm.      Pulses: Normal pulses.      Heart sounds: Normal heart sounds.   Pulmonary:      Effort: Pulmonary effort is normal.      Breath sounds: Normal breath sounds.   Abdominal:      General: Bowel sounds are normal.      Palpations: Abdomen is soft.      Tenderness: There is no abdominal tenderness.   Musculoskeletal:         General: Normal range of motion.   Skin:     General: Skin is warm and dry.   Neurological:      Mental Status: She is alert and oriented to person, place, and time.   Psychiatric:         Mood and Affect: Mood normal.         Behavior: Behavior normal.     Diagnostic Data    Results from last 7 days   Lab Units 02/05/25  0434   WBC 10*3/mm3 7.16   HEMOGLOBIN g/dL 10.8*   HEMATOCRIT % 32.7*   PLATELETS 10*3/mm3 241   GLUCOSE mg/dL 88   CREATININE mg/dL 0.41*   BUN mg/dL 6   SODIUM mmol/L 138   POTASSIUM mmol/L 3.6   AST (SGOT) U/L 13   ALT (SGPT) U/L 14   ALK PHOS U/L 49   BILIRUBIN mg/dL <0.2   ANION GAP mmol/L 11.0       US Ob 14 + Weeks Single or First Gestation    Result Date: 2/4/2025  Impression: 1. Single viable intrauterine pregnancy with an estimated gestational age of 14 weeks and 6 days. There is normal interval growth when compared to the recent ultrasound. 2. The placenta is located posteriorly. The tip of the placenta may cover the internal os representing at least a marginal placenta previa. I would recommend a follow-up ultrasound during the late second trimester to better assess this. There is also a  hypoechoic area in the placenta representing either an old placental infarct or venous lake. There is no placental abruption. Electronically Signed: Jaret Oneill MD  2/4/2025 4:17 PM EST  Workstation ID: [id number]    US Renal Bilateral    Result Date: 2/4/2025  Impression: 1. Mild left-sided hydronephrosis new from prior exam. 2. Normal appearance of the right kidney with no evidence of right-sided hydronephrosis. Electronically Signed: Chin Sawant MD  2/4/2025 4:17 PM EST  Workstation ID: [id number]       I reviewed the patient's new clinical results.  I reviewed the patient's new imaging results and agree with the interpretation.    Assessment/Plan:     Active and Resolved Problems  Active Hospital Problems    Diagnosis  POA    **Hydronephrosis [N13.30]  Yes      Resolved Hospital Problems   No resolved problems to display.         Left sided hydronephrosis  Left flank pain, LLQ abdominal pain   Nausea and vomiting  - renal ultrasound: Mild left-sided hydronephrosis new from prior exam. Normal appearance of the right kidney with no evidence of right-sided hydronephrosis.   - WBC 9.99, hgb 11.2  - UA: moderate blood, RBC 6-10, no wbc, no bacteria  - IVFs, prn tylenol, prn antiemetics  - NPO after MN  - urology consult     15 weeks gestation intrauterine pregnancy   - G1, P0  - OBGYN following      H/o cholecystectomy         Nutrition:   Diet: Regular/House; Fluid Consistency: Thin (IDDSI 0)  NPO Diet NPO Type: Strict NPO     VTE Prophylaxis:  Mechanical VTE prophylaxis orders are present.             Disposition Planning:         Time: 35 minutes     Code Status and Medical Interventions: CPR (Attempt to Resuscitate); Full Support   Ordered at: 02/04/25 1940     Level Of Support Discussed With:    Patient     Code Status (Patient has no pulse and is not breathing):    CPR (Attempt to Resuscitate)     Medical Interventions (Patient has pulse or is breathing):    Full Support       Signature: Electronically signed by  Migue Choudhury MD, 02/05/25, 13:02 EST.  [facility] Hospitalist Team

## 2025-02-05 NOTE — H&P
[facility] Medicine Services    Hospitalist History and Physical     Nicki Salazar : 2000 MRN:[id number] LOS:0 ROOM: [facility]/     Reason for admission: Hydronephrosis     Assessment / Plan     Left sided hydronephrosis  Left flank pain, LLQ abdominal pain   Nausea and vomiting  - renal ultrasound: Mild left-sided hydronephrosis new from prior exam. Normal appearance of the right kidney with no evidence of right-sided hydronephrosis.   - WBC 9.99, hgb 11.2  - UA: moderate blood, RBC 6-10, no wbc, no bacteria  - IVFs, prn tylenol, prn antiemetics  - NPO after MN  - urology consult    15 weeks gestation intrauterine pregnancy   - G1, P0  - OBGYN following     H/o cholecystectomy       Nutrition:   Diet: Regular/House; Fluid Consistency: Thin (IDDSI 0)  NPO Diet NPO Type: Strict NPO     VTE Prophylaxis:  Mechanical VTE prophylaxis orders are present.         History of Present illness     Nicki Salazar is a 24 y.o. healthy female G1, P0 15 weeks gestation presented to [facility] ED 2025 with complaints of left flank pain and left sided abdominal pain, nausea, and vomiting. She stated she had right flank pain a week ago and was treated at [facility] ER for suspected nephrolithiasis and UTI. She was treated with morphine and her pain improved and she was discharged home. She stated her urine looked like sand and then it improved so she was thought to have passed a kidney stone. She returned to [facility] ER 4 days ago for left sided flank pain, nausea, and vomiting. She did not have any workup but was given a Rx for hydrocodone and advised not to take more than 2 doses and to see her OB. She stated she did ok over the weekend and then today while at work she started having left sided flank pain and left sided abdominal pain that is a sharp, stabbing pain. She denied any hematuria. She took a hydrocodone and called her OB Dr. Yu. She was advised to come to [facility] for urology evaluation.     In the  ED the patient had renal ultrasound showed mild left-sided hydronephrosis new from prior exam. Normal appearance of the right kidney with no evidence of right-sided hydronephrosis.   WBC normal 9.99, hgb 11.2. Urinalysis showed moderate blood, RBC 6-10, no bacteria. ER spoke with urology Dr. Sykes who recommended admission and NPO after midnight. Dr. Yu OBGYCARLOS consulted.     No current pain on exam after taking one time dose of her home hydrocodone. She would prefer to not take pain medications if not necessary.      Subjective / Review of systems     Review of Systems   Constitutional: Negative.    HENT: Negative.     Eyes: Negative.    Respiratory: Negative.     Cardiovascular: Negative.    Gastrointestinal:  Positive for abdominal pain, nausea and vomiting.   Genitourinary:  Positive for flank pain.   Skin: Negative.    Neurological: Negative.    Psychiatric/Behavioral: Negative.          Past Medical/Surgical/Social/Family History & Allergies     History reviewed. No pertinent past medical history.   Past Surgical History:   Procedure Laterality Date    CHOLECYSTECTOMY        Social History     Socioeconomic History    Marital status:    Tobacco Use    Smoking status: Never     Passive exposure: Never    Smokeless tobacco: Never   Vaping Use    Vaping status: Never Used   Substance and Sexual Activity    Alcohol use: Never    Drug use: Never    Sexual activity: Defer      History reviewed. No pertinent family history.   No Known Allergies   Social Drivers of Health     Tobacco Use: Low Risk  (2/4/2025)    Patient History     Smoking Tobacco Use: Never     Smokeless Tobacco Use: Never     Passive Exposure: Never   Alcohol Use: Alcohol Misuse (2/4/2025)    AUDIT-C     Frequency of Alcohol Consumption: 2-3 times a week     Average Number of Drinks: 5 or 6     Frequency of Binge Drinking: Monthly   Financial Resource Strain: Low Risk  (2/4/2025)    Overall Financial Resource Strain (CARDIA)     Difficulty of  Paying Living Expenses: Not hard at all   Food Insecurity: No Food Insecurity (2/4/2025)    Hunger Vital Sign     Worried About Running Out of Food in the Last Year: Never true     Ran Out of Food in the Last Year: Never true   Transportation Needs: No Transportation Needs (2/4/2025)    PRAPARE - Transportation     Lack of Transportation (Medical): No     Lack of Transportation (Non-Medical): No   Physical Activity: Insufficiently Active (2/4/2025)    Exercise Vital Sign     Days of Exercise per Week: 3 days     Minutes of Exercise per Session: 30 min   Stress: No Stress Concern Present (2/4/2025)    [employer] [place] [employer] - Occupational Stress Questionnaire     Feeling of Stress : Not at all   Social Connections: Not At Risk (2/4/2025)    Family and Community Support     Help with Day-to-Day Activities: I don't need any help     Lonely or Isolated: Never   Interpersonal Safety: Not At Risk (2/4/2025)    Abuse Screen     Unsafe at Home or Work/School: no     Feels Threatened by Someone?: no     Does Anyone Keep You from Contacting Others or Doint Things Outside the Home?: no     Physical Sign of Abuse Present: no   Depression: Not at risk (2/4/2025)    PHQ-2     PHQ-2 Score: 0   Housing Stability: Not At Risk (2/4/2025)    Housing Stability     Current Living Arrangements: home     Potentially Unsafe Housing Conditions: none   Postpartum Depression: Not on file   Health Literacy: Not At Risk (2/4/2025)    Education     Help with school or training?: No     Preferred Language: English   Employment: Not At Risk (2/4/2025)    Employment     Do you want help finding or keeping work or a job?: I do not need or want help   Utilities: Not At Risk (2/4/2025)    [facility] Utilities     Threatened with loss of utilities: No   Disabilities: Not At Risk (2/4/2025)    Disabilities     Concentrating, Remembering, or Making Decisions Difficulty: no     Doing Errands Independently Difficulty: no        Home Medications      Prior to Admission medications    Medication Sig Start Date End Date Taking? Authorizing Provider   aspirin 81 MG EC tablet Take 1 tablet by mouth Every Night.   Yes Jennie Amaya MD   Prenatal Vit-Fe Fumarate-FA (prenatal vitamin 28-0.8) 28-0.8 MG tablet tablet Take 1 tablet by mouth Daily. 12/12/24  Yes Jennie Amaya MD   [employer] 25 MG tablet Take 1 tablet by mouth every night at bedtime. 12/13/24  Yes Jennie Amaya MD   Vitamin B6 50 MG tablet Take 1 tablet by mouth Every 12 (Twelve) Hours. 12/13/24 2/4/25 Yes Jennie Amaya MD   acetaminophen (TYLENOL) 325 MG tablet Take 2 tablets by mouth Every 6 (Six) Hours As Needed for Mild Pain.    Jennie Amaya MD   ondansetron ODT (ZOFRAN-ODT) 4 MG disintegrating tablet DISSOLVE 1 TABLET ON THE TONGUE EVERY 8 HOURS FOR 10 DAYS AS NEEDED FOR NAUSEA 12/13/24   Jennie Amaya MD   cephalexin (KEFLEX) 500 MG capsule  12/24/24 2/4/25  Jennie Amaya MD   Lidocaine Viscous HCl (XYLOCAINE) 2 % solution Use 5mL every 2-3 hours as needed for sore throat. 1/1/25 2/4/25  MayNiurka APRN   Progesterone (PROMETRIUM) 200 MG capsule TAKE ONE CAPSULE BY MOUTH EVERY EVENING AT BEDTIME 12/14/24 2/4/25  Jennie Amaya MD        Objective / Physical Exam     Vital signs:  Temp: 98 °F (36.7 °C)  BP: 119/79  Heart Rate: 97  Resp: 19  SpO2: 98 %  Weight: 65.1 kg (143 lb 8.3 oz)    Admission Weight: Weight: 65.1 kg (143 lb 8.3 oz)    Physical Exam  Vitals and nursing note reviewed.   HENT:      Head: Normocephalic and atraumatic.   Eyes:      Extraocular Movements: Extraocular movements intact.      Pupils: Pupils are equal, round, and reactive to light.   Cardiovascular:      Rate and Rhythm: Normal rate and regular rhythm.      Pulses: Normal pulses.      Heart sounds: Normal heart sounds.   Pulmonary:      Effort: Pulmonary effort is normal.      Breath sounds: Normal breath sounds.   Abdominal:      General: Bowel sounds  "are normal.      Palpations: Abdomen is soft.      Tenderness: There is no abdominal tenderness.   Musculoskeletal:         General: Normal range of motion.   Skin:     General: Skin is warm and dry.   Neurological:      Mental Status: She is alert and oriented to person, place, and time.   Psychiatric:         Mood and Affect: Mood normal.         Behavior: Behavior normal.          Labs     Results from last 7 days   Lab Units 02/04/25  1433   WBC 10*3/mm3 9.99   HEMOGLOBIN g/dL 11.2*   HEMATOCRIT % 33.0*   PLATELETS 10*3/mm3 244            Invalid input(s): \"BILI TOTAL\"        Results from last 7 days   Lab Units 02/04/25  1433   SODIUM mmol/L 138   POTASSIUM mmol/L 4.0   CHLORIDE mmol/L 104   CO2 mmol/L 20.9*   BUN mg/dL 8   CREATININE mg/dL 0.40*   GLUCOSE mg/dL 85        Imaging     US Ob 14 + Weeks Single or First Gestation    Result Date: 2/4/2025  US OB 14 + WEEKS SINGLE OR FIRST GESTATION Date of Exam: 2/4/2025 3:32 PM EST Indication: Pelvic pain. The patient is approximately 15 weeks pregnant. Comparison: Pregnancy ultrasound performed at [facility] on 1/28/2025. Technique: Grayscale and color Doppler transabdominal ultrasound was performed for maternal and fetal evaluation after the first trimester single gestation. Images were obtained per protocol. Findings: The fetal measurements indicate an estimated gestational age of 14 weeks and 6 days. There is normal interval growth. The estimated fetal weight percentage by Hadlock is 18.7%. The fetal measurements correlate well with one another with no significant discrepancies. There is cardiac motion with a normal rate of 145 bpm. The placenta is located posteriorly. The tip of the placenta may cover the internal os representing a marginal placenta previa. The cervical length measures 4.6 cm. I would recommend a  follow-up ultrasound during the late second trimester to better assess this. There is a hypoechoic area within the placenta measuring " 2.5 x 0.8 x 2.4 cm not seen well on the outside exam. This may represent an old placental infarct or venous lake. There  is no placental abruption. The amniotic fluid volume is normal. The largest pocket of fluid measures 2.5 cm in depth.     Impression: 1. Single viable intrauterine pregnancy with an estimated gestational age of 14 weeks and 6 days. There is normal interval growth when compared to the recent ultrasound. 2. The placenta is located posteriorly. The tip of the placenta may cover the internal os representing at least a marginal placenta previa. I would recommend a follow-up ultrasound during the late second trimester to better assess this. There is also a hypoechoic area in the placenta representing either an old placental infarct or venous lake. There is no placental abruption. Electronically Signed: Jaret Oneill MD  2/4/2025 4:17 PM EST  Workstation ID: [id number]    US Renal Bilateral    Result Date: 2/4/2025  US RENAL BILATERAL Date of Exam: 2/4/2025 3:32 PM EST Indication: flank pain. Comparison: 1/28/2025 Technique: Grayscale and color Doppler ultrasound evaluation of the kidneys and urinary bladder was performed. Findings: Right kidney measure 11.3 cm and left kidney measures 12.1 cm in [id number] length. There is normal thickness and normal echogenicity of the renal parenchyma bilaterally. There is some minimal left-sided hydronephrosis. No right hydronephrosis. There is color Doppler flow to both kidneys. Limited images of the urinary bladder are unremarkable.     Impression: 1. Mild left-sided hydronephrosis new from prior exam. 2. Normal appearance of the right kidney with no evidence of right-sided hydronephrosis. Electronically Signed: Chin Sawant MD  2/4/2025 4:17 PM EST  Workstation ID: [id number]          Current Medications     Scheduled Meds:  sodium chloride, 10 mL, Intravenous, Q12H         Continuous Infusions:  [START ON 2/5/2025] sodium chloride, 75 mL/hr         Mayra  Delmy, [facility] Medicine  02/04/25   23:05 EST

## 2025-03-14 ENCOUNTER — TELEPHONE (OUTPATIENT)
Dept: OBSTETRICS AND GYNECOLOGY | Age: 25
End: 2025-03-14
Payer: COMMERCIAL

## 2025-03-14 NOTE — TELEPHONE ENCOUNTER
DR LANDEROS APPROVED TRANSFER - LVM FOR PT TO CALL BACK TO SCHEDULE APPT.  NEEDS ANATOMY SCAN UNLESS SHE ALREADY HAD ONE DONE (WE WOULD NEED A COPY)  SHE IS 20.3 WEEKS.

## 2025-04-02 ENCOUNTER — HOSPITAL ENCOUNTER (OUTPATIENT)
Facility: HOSPITAL | Age: 25
Discharge: HOME | End: 2025-04-02
Attending: STUDENT IN AN ORGANIZED HEALTH CARE EDUCATION/TRAINING PROGRAM | Admitting: STUDENT IN AN ORGANIZED HEALTH CARE EDUCATION/TRAINING PROGRAM
Payer: COMMERCIAL

## 2025-04-02 VITALS
SYSTOLIC BLOOD PRESSURE: 111 MMHG | DIASTOLIC BLOOD PRESSURE: 71 MMHG | OXYGEN SATURATION: 96 % | BODY MASS INDEX: 30.82 KG/M2 | HEIGHT: 58 IN | TEMPERATURE: 98.5 F | WEIGHT: 146.83 LBS | RESPIRATION RATE: 18 BRPM | HEART RATE: 97 BPM

## 2025-04-02 LAB
BILIRUB UR QL STRIP: NEGATIVE
CLARITY UR: CLEAR
COLOR UR: YELLOW
DEPRECATED RDW RBC AUTO: 44.1 FL (ref 37–54)
ERYTHROCYTE [DISTWIDTH] IN BLOOD BY AUTOMATED COUNT: 13.5 % (ref 12.3–15.4)
GLUCOSE UR STRIP-MCNC: NEGATIVE MG/DL
HCT VFR BLD AUTO: 34.9 % (ref 34–46.6)
HGB BLD-MCNC: 11.6 G/DL (ref 12–15.9)
HGB UR QL STRIP.AUTO: NEGATIVE
KETONES UR QL STRIP: ABNORMAL
LEUKOCYTE ESTERASE UR QL STRIP.AUTO: NEGATIVE
MCH RBC QN AUTO: 30 PG (ref 26.6–33)
MCHC RBC AUTO-ENTMCNC: 33.2 G/DL (ref 31.5–35.7)
MCV RBC AUTO: 90.2 FL (ref 79–97)
NITRITE UR QL STRIP: NEGATIVE
PH UR STRIP.AUTO: 6 [PH] (ref 5–8)
PLATELET # BLD AUTO: 262 10*3/MM3 (ref 140–450)
PMV BLD AUTO: 9.6 FL (ref 6–12)
PROT UR QL STRIP: ABNORMAL
RBC # BLD AUTO: 3.87 10*6/MM3 (ref 3.77–5.28)
SP GR UR STRIP: 1.03 (ref 1–1.03)
UROBILINOGEN UR QL STRIP: ABNORMAL
WBC NRBC COR # BLD AUTO: 18.44 10*3/MM3 (ref 3.4–10.8)

## 2025-04-02 PROCEDURE — 81003 URINALYSIS AUTO W/O SCOPE: CPT | Performed by: STUDENT IN AN ORGANIZED HEALTH CARE EDUCATION/TRAINING PROGRAM

## 2025-04-02 PROCEDURE — G0463 HOSPITAL OUTPT CLINIC VISIT: HCPCS

## 2025-04-02 PROCEDURE — 25810000003 LACTATED RINGERS SOLUTION: Performed by: STUDENT IN AN ORGANIZED HEALTH CARE EDUCATION/TRAINING PROGRAM

## 2025-04-02 PROCEDURE — 85027 COMPLETE CBC AUTOMATED: CPT | Performed by: STUDENT IN AN ORGANIZED HEALTH CARE EDUCATION/TRAINING PROGRAM

## 2025-04-02 RX ORDER — SODIUM CHLORIDE 0.9 % (FLUSH) 0.9 %
10 SYRINGE (ML) INJECTION EVERY 12 HOURS SCHEDULED
Status: DISCONTINUED | OUTPATIENT
Start: 2025-04-02 | End: 2025-04-02 | Stop reason: HOSPADM

## 2025-04-02 RX ORDER — LIDOCAINE HYDROCHLORIDE 10 MG/ML
0.5 INJECTION, SOLUTION EPIDURAL; INFILTRATION; INTRACAUDAL; PERINEURAL ONCE AS NEEDED
Status: DISCONTINUED | OUTPATIENT
Start: 2025-04-02 | End: 2025-04-02 | Stop reason: HOSPADM

## 2025-04-02 RX ORDER — ACETAMINOPHEN 500 MG
1000 TABLET ORAL EVERY 6 HOURS PRN
Status: DISCONTINUED | OUTPATIENT
Start: 2025-04-02 | End: 2025-04-02 | Stop reason: HOSPADM

## 2025-04-02 RX ORDER — HYDROCODONE BITARTRATE AND ACETAMINOPHEN 5; 325 MG/1; MG/1
1 TABLET ORAL EVERY 6 HOURS PRN
COMMUNITY

## 2025-04-02 RX ORDER — SODIUM CHLORIDE, SODIUM LACTATE, POTASSIUM CHLORIDE, CALCIUM CHLORIDE 600; 310; 30; 20 MG/100ML; MG/100ML; MG/100ML; MG/100ML
999 INJECTION, SOLUTION INTRAVENOUS CONTINUOUS
Status: DISPENSED | OUTPATIENT
Start: 2025-04-02 | End: 2025-04-02

## 2025-04-02 RX ORDER — HYDROXYZINE HYDROCHLORIDE 25 MG/1
50 TABLET, FILM COATED ORAL 3 TIMES DAILY PRN
Status: COMPLETED | OUTPATIENT
Start: 2025-04-02 | End: 2025-04-02

## 2025-04-02 RX ORDER — SODIUM CHLORIDE 0.9 % (FLUSH) 0.9 %
10 SYRINGE (ML) INJECTION AS NEEDED
Status: DISCONTINUED | OUTPATIENT
Start: 2025-04-02 | End: 2025-04-02 | Stop reason: HOSPADM

## 2025-04-02 RX ORDER — SODIUM CHLORIDE 9 MG/ML
40 INJECTION, SOLUTION INTRAVENOUS AS NEEDED
Status: DISCONTINUED | OUTPATIENT
Start: 2025-04-02 | End: 2025-04-02 | Stop reason: HOSPADM

## 2025-04-02 RX ADMIN — ACETAMINOPHEN 1000 MG: 500 TABLET, FILM COATED ORAL at 18:11

## 2025-04-02 RX ADMIN — HYDROXYZINE HYDROCHLORIDE 50 MG: 25 TABLET, FILM COATED ORAL at 18:11

## 2025-04-02 RX ADMIN — SODIUM CHLORIDE, SODIUM LACTATE, POTASSIUM CHLORIDE, CALCIUM CHLORIDE 1000 ML: 20; 30; 600; 310 INJECTION, SOLUTION INTRAVENOUS at 18:10

## 2025-04-02 NOTE — PLAN OF CARE
Goal Outcome Evaluation:  Plan of Care Reviewed With: patient Support at bedside. Waiting for medication to help with pain. Waiting for lab results.

## 2025-04-02 NOTE — NURSING NOTE
Received to triage 1 with c/o back pain lower abdominal pain. HX of kidney stones at 14 weeks. Took 1/2 hydrocodone for pain, did not help. 23 wks 1 day gestation, 's. Afebrile. Pt. Feels nauseous when she has the back pain.BP WNL.